# Patient Record
Sex: MALE | URBAN - METROPOLITAN AREA
[De-identification: names, ages, dates, MRNs, and addresses within clinical notes are randomized per-mention and may not be internally consistent; named-entity substitution may affect disease eponyms.]

---

## 2021-07-22 ENCOUNTER — OFFICE VISIT (OUTPATIENT)
Dept: URBAN - METROPOLITAN AREA CLINIC 11 | Facility: CLINIC | Age: 56
End: 2021-07-22
Payer: COMMERCIAL

## 2021-07-22 PROCEDURE — 99204 OFFICE O/P NEW MOD 45 MIN: CPT | Performed by: OPTOMETRIST

## 2021-07-22 RX ORDER — TRIFLURIDINE 1 G/100ML
1 % SOLUTION OPHTHALMIC
Qty: 5 | Refills: 2 | Status: ACTIVE
Start: 2021-07-22

## 2021-07-22 RX ORDER — VALACYCLOVIR 500 MG/1
500 MG TABLET, FILM COATED ORAL
Qty: 20 | Refills: 0 | Status: ACTIVE
Start: 2021-07-22

## 2021-07-22 ASSESSMENT — INTRAOCULAR PRESSURE: OD: 20

## 2021-07-22 NOTE — IMPRESSION/PLAN
Impression: Herpesviral keratitis: B00.52. Plan: Educated patient on exam findings and discussed condition. Start Trifluridine 9x/day OS x 7days, Rx Valacyclovir 500mg BID PO x 10 days.  RTC 7days or sooner if worsens

## 2021-07-29 ENCOUNTER — OFFICE VISIT (OUTPATIENT)
Dept: URBAN - METROPOLITAN AREA CLINIC 45 | Facility: CLINIC | Age: 56
End: 2021-07-29
Payer: COMMERCIAL

## 2021-07-29 DIAGNOSIS — B00.52 HERPESVIRAL KERATITIS: Primary | ICD-10-CM

## 2021-07-29 PROCEDURE — 99214 OFFICE O/P EST MOD 30 MIN: CPT | Performed by: OPTOMETRIST

## 2021-07-29 RX ORDER — PREDNISOLONE ACETATE 10 MG/ML
1 % SUSPENSION/ DROPS OPHTHALMIC
Qty: 5 | Refills: 0 | Status: ACTIVE
Start: 2021-07-29

## 2021-07-29 NOTE — IMPRESSION/PLAN
Impression: Herpesviral keratitis: B00.52. Plan: Dendrite Resolved, epithelium intact, 2mm disciform keratitis at today's visit. Cont Trifluriding 9x/d OS x 5 d, Valacyclovir 500mg BID x 3 days, Add Pred Acetate BID OS.  RTC 3-4 days f/u

## 2021-08-02 ENCOUNTER — OFFICE VISIT (OUTPATIENT)
Dept: URBAN - METROPOLITAN AREA CLINIC 45 | Facility: CLINIC | Age: 56
End: 2021-08-02
Payer: COMMERCIAL

## 2021-08-02 PROCEDURE — 99213 OFFICE O/P EST LOW 20 MIN: CPT | Performed by: OPTOMETRIST

## 2021-08-02 NOTE — IMPRESSION/PLAN
Impression: Herpesviral keratitis: B00.52. Plan: Improving, dense infiltrate remaining. Decr Trifluridine to 7x/day, incr pred acetate to QID. Finish Valacyclovir. check in 1wk or sooner if condition worsens.